# Patient Record
Sex: MALE | Race: WHITE | NOT HISPANIC OR LATINO | Employment: FULL TIME | URBAN - METROPOLITAN AREA
[De-identification: names, ages, dates, MRNs, and addresses within clinical notes are randomized per-mention and may not be internally consistent; named-entity substitution may affect disease eponyms.]

---

## 2018-11-21 ENCOUNTER — OFFICE VISIT (OUTPATIENT)
Dept: RETAIL CLINIC | Facility: CLINIC | Age: 38
End: 2018-11-21

## 2018-11-21 VITALS
OXYGEN SATURATION: 98 % | RESPIRATION RATE: 14 BRPM | HEART RATE: 76 BPM | SYSTOLIC BLOOD PRESSURE: 138 MMHG | DIASTOLIC BLOOD PRESSURE: 86 MMHG | TEMPERATURE: 98.4 F

## 2018-11-21 DIAGNOSIS — R05.9 COUGH: ICD-10-CM

## 2018-11-21 DIAGNOSIS — H65.112 ACUTE MUCOID OTITIS MEDIA OF LEFT EAR: Primary | ICD-10-CM

## 2018-11-21 PROCEDURE — 99213 OFFICE O/P EST LOW 20 MIN: CPT | Performed by: NURSE PRACTITIONER

## 2018-11-21 RX ORDER — OLMESARTAN MEDOXOMIL AND HYDROCHLOROTHIAZIDE 40/12.5 40; 12.5 MG/1; MG/1
1 TABLET ORAL DAILY
COMMUNITY

## 2018-11-21 RX ORDER — FLUTICASONE PROPIONATE 50 MCG
1 SPRAY, SUSPENSION (ML) NASAL 2 TIMES DAILY
Qty: 1 BOTTLE | Refills: 0 | Status: SHIPPED | OUTPATIENT
Start: 2018-11-21 | End: 2018-12-05

## 2018-11-21 RX ORDER — BENZONATATE 100 MG/1
CAPSULE ORAL
Qty: 20 CAPSULE | Refills: 0 | Status: SHIPPED | OUTPATIENT
Start: 2018-11-21 | End: 2019-02-15

## 2018-11-21 RX ORDER — AMOXICILLIN 875 MG/1
875 TABLET, COATED ORAL 2 TIMES DAILY
Qty: 20 TABLET | Refills: 0 | Status: SHIPPED | OUTPATIENT
Start: 2018-11-21 | End: 2018-12-01

## 2018-11-21 NOTE — PROGRESS NOTES
"Subjective     Marcos Walton is a 38 y.o.. male.     Sinus Problem   This is a new problem. Episode onset: 2-3 days. The problem is unchanged. Associated symptoms include congestion, coughing (non-productive, through out day), ear pain (\"itchy\"), headaches and a sore throat (\"scratchy\"). Treatments tried: singulair and decongestant. The treatment provided mild relief.       The following portions of the patient's history were reviewed and updated as appropriate: allergies, current medications, past family history, past medical history, past social history and past surgical history.    Review of Systems   Constitutional: Negative for fever.   HENT: Positive for congestion, ear pain (\"itchy\"), rhinorrhea and sore throat (\"scratchy\").    Respiratory: Positive for cough (non-productive, through out day).    Gastrointestinal: Positive for nausea. Negative for abdominal pain, diarrhea and vomiting.   Neurological: Positive for headaches.       Objective     Vitals:    11/21/18 1803   BP: 138/86   BP Location: Left arm   Patient Position: Sitting   Cuff Size: Adult   Pulse: 76   Resp: 14   Temp: 98.4 °F (36.9 °C)   TempSrc: Oral   SpO2: 98%       Physical Exam   Constitutional: He is oriented to person, place, and time. He appears well-developed and well-nourished.   HENT:   Head: Normocephalic and atraumatic.   Right Ear: Tympanic membrane normal. Tympanic membrane is not erythematous.   Left Ear: Tympanic membrane is erythematous. A middle ear effusion is present.   Nose: Nose normal. Right sinus exhibits no maxillary sinus tenderness and no frontal sinus tenderness. Left sinus exhibits no maxillary sinus tenderness and no frontal sinus tenderness.   Mouth/Throat: Oropharynx is clear and moist. Oropharyngeal exudate: pnd.   Eyes: Conjunctivae are normal. Pupils are equal, round, and reactive to light.   Cardiovascular: Normal rate and regular rhythm.   No murmur heard.  Pulmonary/Chest: Effort normal. He has no wheezes. " He has no rhonchi. He has no rales.   Musculoskeletal: Normal range of motion.   Lymphadenopathy:     He has cervical adenopathy.   Neurological: He is alert and oriented to person, place, and time.   Skin: Skin is warm and dry.   Vitals reviewed.      Assessment/Plan   Marcos was seen today for sinus problem.    Diagnoses and all orders for this visit:    Acute mucoid otitis media of left ear  -     amoxicillin (AMOXIL) 875 MG tablet; Take 1 tablet by mouth 2 (Two) Times a Day for 10 days.  -     fluticasone (FLONASE) 50 MCG/ACT nasal spray; 1 spray into the nostril(s) as directed by provider 2 (Two) Times a Day for 14 days.    Cough  -     benzonatate (TESSALON PERLES) 100 MG capsule; 1 capsule 2 times a day as needed for coughing for 10 days        Patient Instructions   Otitis Media, Adult  Otitis media occurs when there is inflammation and fluid in the middle ear. Your middle ear is a part of the ear that contains bones for hearing as well as air that helps send sounds to your brain.  What are the causes?  This condition is caused by a blockage in the eustachian tube. This tube drains fluid from the ear to the back of the nose (nasopharynx). A blockage in this tube can be caused by an object or by swelling (edema) in the tube. Problems that can cause a blockage include:  · A cold or other upper respiratory infection.  · Allergies.  · An irritant, such as tobacco smoke.  · Enlarged adenoids. The adenoids are areas of soft tissue located high in the back of the throat, behind the nose and the roof of the mouth.  · A mass in the nasopharynx.  · Damage to the ear caused by pressure changes (barotrauma).    What are the signs or symptoms?  Symptoms of this condition include:  · Ear pain.  · A fever.  · Decreased hearing.  · A headache.  · Tiredness (lethargy).  · Fluid leaking from the ear.  · Ringing in the ear.    How is this diagnosed?  This condition is diagnosed with a physical exam. During the exam your  health care provider will use an instrument called an otoscope to look into your ear and check for redness, swelling, and fluid. He or she will also ask about your symptoms.  Your health care provider may also order tests, such as:  · A test to check the movement of the eardrum (pneumatic otoscopy). This test is done by squeezing a small amount of air into the ear.  · A test that changes air pressure in the middle ear to check how well the eardrum moves and whether the eustachian tube is working (tympanogram).    How is this treated?  This condition usually goes away on its own within 3-5 days. But if the condition is caused by a bacteria infection and does not go away own its own, or keeps coming back, your health care provider may:  · Prescribe antibiotic medicines to treat the infection.  · Prescribe or recommend medicines to control pain.    Follow these instructions at home:  · Take over-the-counter and prescription medicines only as told by your health care provider.  · If you were prescribed an antibiotic medicine, take it as told by your health care provider. Do not stop taking the antibiotic even if you start to feel better.  · Keep all follow-up visits as told by your health care provider. This is important.  Contact a health care provider if:  · You have bleeding from your nose.  · There is a lump on your neck.  · You are not getting better in 5 days.  · You feel worse instead of better.  Get help right away if:  · You have severe pain that is not controlled with medicine.  · You have swelling, redness, or pain around your ear.  · You have stiffness in your neck.  · A part of your face is paralyzed.  · The bone behind your ear (mastoid) is tender when you touch it.  · You develop a severe headache.  Summary  · Otitis media is redness, soreness, and swelling of the middle ear.  · This condition usually goes away on its own within 3-5 days.  · If the problem does not go away in 3-5 days, your health care  provider may prescribe or recommend medicines to treat your symptoms.  · If you were prescribed an antibiotic medicine, take it as told by your health care provider.  This information is not intended to replace advice given to you by your health care provider. Make sure you discuss any questions you have with your health care provider.  Document Released: 09/22/2005 Document Revised: 12/08/2017 Document Reviewed: 12/08/2017  [employer] Interactive Patient Education © 2018 [employer]        Return if symptoms worsen or fail to improve with urgent care/ER.

## 2018-11-21 NOTE — PATIENT INSTRUCTIONS

## 2019-02-15 ENCOUNTER — OFFICE VISIT (OUTPATIENT)
Dept: RETAIL CLINIC | Facility: CLINIC | Age: 39
End: 2019-02-15

## 2019-02-15 VITALS
RESPIRATION RATE: 17 BRPM | HEART RATE: 84 BPM | OXYGEN SATURATION: 98 % | SYSTOLIC BLOOD PRESSURE: 126 MMHG | DIASTOLIC BLOOD PRESSURE: 84 MMHG | TEMPERATURE: 98.9 F

## 2019-02-15 DIAGNOSIS — J01.20 ACUTE NON-RECURRENT ETHMOIDAL SINUSITIS: Primary | ICD-10-CM

## 2019-02-15 PROCEDURE — 99213 OFFICE O/P EST LOW 20 MIN: CPT | Performed by: NURSE PRACTITIONER

## 2019-02-15 RX ORDER — AMOXICILLIN 875 MG/1
875 TABLET, COATED ORAL 2 TIMES DAILY
Qty: 14 TABLET | Refills: 0 | Status: SHIPPED | OUTPATIENT
Start: 2019-02-15 | End: 2019-02-22

## 2019-02-15 RX ORDER — GUAIFENESIN 600 MG/1
1200 TABLET, EXTENDED RELEASE ORAL 2 TIMES DAILY
Start: 2019-02-15 | End: 2019-08-31

## 2019-02-15 RX ORDER — FLUTICASONE PROPIONATE 50 MCG
1 SPRAY, SUSPENSION (ML) NASAL 2 TIMES DAILY
Start: 2019-02-15 | End: 2019-03-17

## 2019-02-15 NOTE — PATIENT INSTRUCTIONS

## 2019-02-15 NOTE — PROGRESS NOTES
Subjective   Marcos Walton is a 38 y.o. male.     Sinusitis   This is a new problem. The current episode started in the past 7 days. The problem has been rapidly worsening since onset. There has been no fever. The pain is severe. Associated symptoms include chills, congestion, coughing, headaches, sinus pressure, sneezing and a sore throat. Pertinent negatives include no diaphoresis, ear pain, neck pain or shortness of breath. Past treatments include oral decongestants. The treatment provided no relief.       The following portions of the patient's history were reviewed and updated as appropriate: allergies, current medications, past medical history, past social history, past surgical history and problem list.    Review of Systems   Constitutional: Positive for appetite change (diminished), chills and fatigue. Negative for diaphoresis and fever.   HENT: Positive for congestion, postnasal drip, rhinorrhea, sinus pressure, sinus pain, sneezing and sore throat. Negative for ear discharge, ear pain, mouth sores, nosebleeds, trouble swallowing and voice change.    Eyes: Positive for pain (pressure behind both eyes ). Negative for redness and itching.   Respiratory: Positive for cough. Negative for chest tightness, shortness of breath, wheezing and stridor.    Cardiovascular: Negative for chest pain.   Gastrointestinal: Negative for diarrhea, nausea and vomiting.   Genitourinary: Negative for decreased urine volume.   Musculoskeletal: Negative for myalgias and neck pain.   Allergic/Immunologic: Positive for environmental allergies. Negative for immunocompromised state.   Neurological: Positive for dizziness and headaches. Negative for syncope and weakness.       Objective   Physical Exam   Constitutional: He is oriented to person, place, and time. He appears well-developed and well-nourished. He is cooperative.  Non-toxic appearance. He does not appear ill. No distress.   HENT:   Right Ear: External ear and ear canal  normal. Tympanic membrane is not perforated and not erythematous. A middle ear effusion is present.   Left Ear: External ear and ear canal normal. Tympanic membrane is not perforated and not erythematous. A middle ear effusion is present.   Nose: Mucosal edema, sinus tenderness and septal deviation present. No rhinorrhea. Right sinus exhibits no maxillary sinus tenderness and no frontal sinus tenderness. Left sinus exhibits no maxillary sinus tenderness and no frontal sinus tenderness.   Mouth/Throat: Uvula is midline, oropharynx is clear and moist and mucous membranes are normal. No oral lesions. No uvula swelling. No oropharyngeal exudate, posterior oropharyngeal edema or posterior oropharyngeal erythema. Tonsils are 2+ on the right. Tonsils are 2+ on the left. No tonsillar exudate.   Eyes: Conjunctivae and lids are normal.   Cardiovascular: Normal rate, regular rhythm, S1 normal and S2 normal.   Pulmonary/Chest: Effort normal and breath sounds normal.   Lymphadenopathy:     He has no cervical adenopathy.   Neurological: He is alert and oriented to person, place, and time.   Skin: Skin is warm and dry. He is not diaphoretic.   Vitals reviewed.      Assessment/Plan   Marcos was seen today for sinusitis and cough.    Diagnoses and all orders for this visit:    Acute non-recurrent ethmoidal sinusitis  -     fluticasone (FLONASE) 50 MCG/ACT nasal spray; 1 spray into the nostril(s) as directed by provider 2 (Two) Times a Day for 30 days.  -     amoxicillin (AMOXIL) 875 MG tablet; Take 1 tablet by mouth 2 (Two) Times a Day for 7 days.  -     guaiFENesin (MUCINEX) 600 MG 12 hr tablet; Take 2 tablets by mouth 2 (Two) Times a Day.          -     F/U with PCP for persistent symptoms or UC/ER for worsening symptoms

## 2019-08-31 ENCOUNTER — OFFICE VISIT (OUTPATIENT)
Dept: RETAIL CLINIC | Facility: CLINIC | Age: 39
End: 2019-08-31

## 2019-08-31 VITALS
OXYGEN SATURATION: 98 % | SYSTOLIC BLOOD PRESSURE: 124 MMHG | HEART RATE: 74 BPM | DIASTOLIC BLOOD PRESSURE: 78 MMHG | TEMPERATURE: 98.4 F

## 2019-08-31 DIAGNOSIS — J30.9 ALLERGIC RHINITIS, UNSPECIFIED SEASONALITY, UNSPECIFIED TRIGGER: ICD-10-CM

## 2019-08-31 DIAGNOSIS — R09.89 SENSATION OF FOREIGN BODY IN THROAT: Primary | ICD-10-CM

## 2019-08-31 PROCEDURE — 99213 OFFICE O/P EST LOW 20 MIN: CPT | Performed by: NURSE PRACTITIONER

## 2019-08-31 RX ORDER — LORATADINE 10 MG/1
10 TABLET ORAL DAILY
COMMUNITY
End: 2020-10-05

## 2019-08-31 RX ORDER — AZELASTINE 1 MG/ML
2 SPRAY, METERED NASAL 2 TIMES DAILY
Qty: 30 ML | Refills: 0 | Status: SHIPPED | OUTPATIENT
Start: 2019-08-31 | End: 2019-09-30

## 2019-08-31 RX ORDER — METHYLPREDNISOLONE 4 MG/1
TABLET ORAL
Qty: 1 EACH | Refills: 0 | Status: SHIPPED | OUTPATIENT
Start: 2019-08-31 | End: 2020-10-05

## 2019-08-31 NOTE — PATIENT INSTRUCTIONS
Allergic Rhinitis, Adult  Allergic rhinitis is an allergic reaction that affects the mucous membrane inside the nose. It causes sneezing, a runny or stuffy nose, and the feeling of mucus going down the back of the throat (postnasal drip). Allergic rhinitis can be mild to severe.  There are two types of allergic rhinitis:  · Seasonal. This type is also called hay fever. It happens only during certain seasons.  · Perennial. This type can happen at any time of the year.  What are the causes?  This condition happens when the body's defense system (immune system) responds to certain harmless substances called allergens as though they were germs.   Seasonal allergic rhinitis is triggered by pollen, which can come from grasses, trees, and weeds. Perennial allergic rhinitis may be caused by:  · House dust mites.  · Pet dander.  · Mold spores.  What are the signs or symptoms?  Symptoms of this condition include:  · Sneezing.  · Runny or stuffy nose (nasal congestion).  · Postnasal drip.  · Itchy nose.  · Tearing of the eyes.  · Trouble sleeping.  · Daytime sleepiness.  How is this diagnosed?  This condition may be diagnosed based on:  · Your medical history.  · A physical exam.  · Tests to check for related conditions, such as:  ? Asthma.  ? Pink eye.  ? Ear infection.  ? Upper respiratory infection.  · Tests to find out which allergens trigger your symptoms. These may include skin or blood tests.  How is this treated?  There is no cure for this condition, but treatment can help control symptoms. Treatment may include:  · Taking medicines that block allergy symptoms, such as antihistamines. Medicine may be given as a shot, nasal spray, or pill.  · Avoiding the allergen.  · Desensitization. This treatment involves getting ongoing shots until your body becomes less sensitive to the allergen. This treatment may be done if other treatments do not help.  · If taking medicine and avoiding the allergen does not work, new, stronger  medicines may be prescribed.  Follow these instructions at home:  · Find out what you are allergic to. Common allergens include smoke, dust, and pollen.  · Avoid the things you are allergic to. These are some things you can do to help avoid allergens:  ? Replace carpet with wood, tile, or vinyl ross. Carpet can trap dander and dust.  ? Do not smoke. Do not allow smoking in your home.  ? Change your heating and air conditioning filter at least once a month.  ? During allergy season:  § Keep windows closed as much as possible.  § Plan outdoor activities when pollen counts are lowest. This is usually during the evening hours.  § When coming indoors, change clothing and shower before sitting on furniture or bedding.  · Take over-the-counter and prescription medicines only as told by your health care provider.  · Keep all follow-up visits as told by your health care provider. This is important.  Contact a health care provider if:  · You have a fever.  · You develop a persistent cough.  · You make whistling sounds when you breathe (you wheeze).  · Your symptoms interfere with your normal daily activities.  Get help right away if:  · You have shortness of breath.  Summary  · This condition can be managed by taking medicines as directed and avoiding allergens.  · Contact your health care provider if you develop a persistent cough or fever.  · During allergy season, keep windows closed as much as possible.  This information is not intended to replace advice given to you by your health care provider. Make sure you discuss any questions you have with your health care provider.  Document Released: 09/12/2002 Document Revised: 01/25/2018 Document Reviewed: 01/25/2018  [employer] Interactive Patient Education © 2019 [employer]

## 2019-08-31 NOTE — PROGRESS NOTES
Subjective   Marcos Walton is a 38 y.o. male.     Patient reports sneezing yesterday and having a sudden feeling as if something was stuck in his throat. Today it hurts to swallow but he denies choking or difficulty breathing. He is able to point directly to the area in his throat that is painful. He denies any epidsode in recent past choking on solids/liquids. Denies bloody sputum or upset stomach. He has been experiencing rhinorrhea, congestion, and post nasal drainage x 3 weeks and continues to take claritin which he has used daily for several months.       Sinus Problem   This is a new problem. Episode onset: 3 weeks ago. The problem has been waxing and waning since onset. There has been no fever. The pain is moderate. Associated symptoms include congestion, coughing (nonproductive, mild), sinus pressure, sneezing and a sore throat. Pertinent negatives include no chills, diaphoresis, ear pain, headaches, hoarse voice, neck pain, shortness of breath or swollen glands. Treatments tried: claritin. The treatment provided no relief.       The following portions of the patient's history were reviewed and updated as appropriate: allergies, current medications, past medical history, past social history, past surgical history and problem list.    Review of Systems   Constitutional: Positive for fatigue. Negative for appetite change, chills, diaphoresis and fever.   HENT: Positive for congestion, postnasal drip, rhinorrhea, sinus pressure, sinus pain, sneezing and sore throat. Negative for ear discharge, ear pain, hearing loss, hoarse voice, mouth sores, tinnitus, trouble swallowing and voice change.    Eyes: Negative for redness and itching.   Respiratory: Positive for cough (nonproductive, mild). Negative for choking, chest tightness, shortness of breath, wheezing and stridor.    Cardiovascular: Negative for chest pain.   Gastrointestinal: Negative for diarrhea, nausea and vomiting.   Musculoskeletal: Negative for  myalgias and neck pain.   Allergic/Immunologic: Positive for environmental allergies. Negative for immunocompromised state.   Neurological: Negative for dizziness and headaches.       Objective   Physical Exam   Constitutional: He is oriented to person, place, and time. He appears well-developed and well-nourished. He is cooperative. He does not appear ill. No distress.   HENT:   Right Ear: Tympanic membrane, external ear and ear canal normal.   Left Ear: Tympanic membrane, external ear and ear canal normal.   Nose: Mucosal edema present. No rhinorrhea. Right sinus exhibits no maxillary sinus tenderness and no frontal sinus tenderness. Left sinus exhibits no maxillary sinus tenderness and no frontal sinus tenderness.   Mouth/Throat: Uvula is midline and mucous membranes are normal. No oral lesions. No uvula swelling. Posterior oropharyngeal erythema present. No posterior oropharyngeal edema. Tonsils are 2+ on the right. Tonsils are 2+ on the left. No tonsillar exudate.   Small amount of clear post nasal drainage     Eyes: Conjunctivae and lids are normal.   Neck:       Cardiovascular: Normal rate, regular rhythm, S1 normal and S2 normal.   Pulmonary/Chest: Effort normal and breath sounds normal.   Lymphadenopathy:     He has no cervical adenopathy.   Neurological: He is alert and oriented to person, place, and time.   Skin: Skin is warm and dry. He is not diaphoretic.   Vitals reviewed.      Assessment/Plan   Marcos was seen today for sinus problem and sore throat.    Diagnoses and all orders for this visit:    Sensation of foreign body in throat  -     methylPREDNISolone (MEDROL, CIRILO,) 4 MG tablet; Take as directed on package instructions.    Allergic rhinitis, unspecified seasonality, unspecified trigger  -     azelastine (ASTELIN) 0.1 % nasal spray; 2 sprays into the nostril(s) as directed by provider 2 (Two) Times a Day for 30 days. Use in each nostril as directed          -     Discussed if experiences any  difficulty breathing or swallowing liquids/solids-must f/u at ER immediately        -     If medications prescribed today do not improve globus sensation in throat, must f/u with PCP for further evaluation

## 2024-12-26 ENCOUNTER — HOSPITAL ENCOUNTER (OUTPATIENT)
Dept: GENERAL RADIOLOGY | Facility: HOSPITAL | Age: 44
Discharge: HOME | End: 2024-12-26
Admitting: FAMILY MEDICINE
Payer: COMMERCIAL

## 2024-12-26 ENCOUNTER — TRANSCRIBE ORDERS (OUTPATIENT)
Dept: ADMINISTRATIVE | Facility: HOSPITAL | Age: 44
End: 2024-12-26
Payer: COMMERCIAL

## 2024-12-26 DIAGNOSIS — J20.9 ACUTE BRONCHITIS, UNSPECIFIED ORGANISM: Primary | ICD-10-CM

## 2024-12-26 DIAGNOSIS — J20.9 ACUTE BRONCHITIS, UNSPECIFIED ORGANISM: ICD-10-CM

## 2024-12-26 PROCEDURE — 71046 X-RAY EXAM CHEST 2 VIEWS: CPT
